# Patient Record
Sex: FEMALE | Race: BLACK OR AFRICAN AMERICAN | NOT HISPANIC OR LATINO | Employment: PART TIME | URBAN - METROPOLITAN AREA
[De-identification: names, ages, dates, MRNs, and addresses within clinical notes are randomized per-mention and may not be internally consistent; named-entity substitution may affect disease eponyms.]

---

## 2023-10-18 DIAGNOSIS — M54.50 LOW BACK PAIN, UNSPECIFIED BACK PAIN LATERALITY, UNSPECIFIED CHRONICITY, UNSPECIFIED WHETHER SCIATICA PRESENT: Primary | ICD-10-CM

## 2023-10-20 RX ORDER — CYCLOBENZAPRINE HCL 10 MG
10 TABLET ORAL NIGHTLY PRN
Qty: 30 TABLET | Refills: 1 | Status: SHIPPED | OUTPATIENT
Start: 2023-10-20 | End: 2023-12-27

## 2023-12-23 DIAGNOSIS — M54.50 LOW BACK PAIN, UNSPECIFIED BACK PAIN LATERALITY, UNSPECIFIED CHRONICITY, UNSPECIFIED WHETHER SCIATICA PRESENT: ICD-10-CM

## 2023-12-27 RX ORDER — CYCLOBENZAPRINE HCL 10 MG
10 TABLET ORAL NIGHTLY PRN
Qty: 30 TABLET | Refills: 1 | Status: SHIPPED | OUTPATIENT
Start: 2023-12-27 | End: 2024-03-06

## 2024-01-28 DIAGNOSIS — M16.0 BILATERAL PRIMARY OSTEOARTHRITIS OF HIP: ICD-10-CM

## 2024-01-29 RX ORDER — DICLOFENAC SODIUM 25 MG/1
25 TABLET, DELAYED RELEASE ORAL DAILY
Qty: 30 TABLET | Refills: 1 | Status: SHIPPED | OUTPATIENT
Start: 2024-01-29 | End: 2024-04-08

## 2024-03-06 DIAGNOSIS — M54.50 LOW BACK PAIN, UNSPECIFIED BACK PAIN LATERALITY, UNSPECIFIED CHRONICITY, UNSPECIFIED WHETHER SCIATICA PRESENT: ICD-10-CM

## 2024-03-06 RX ORDER — CYCLOBENZAPRINE HCL 10 MG
10 TABLET ORAL NIGHTLY PRN
Qty: 30 TABLET | Refills: 1 | Status: SHIPPED | OUTPATIENT
Start: 2024-03-06 | End: 2024-05-08

## 2024-03-26 PROBLEM — L73.9 FOLLICULITIS: Status: ACTIVE | Noted: 2024-03-26

## 2024-03-26 PROBLEM — M54.50 LOW BACK PAIN: Status: ACTIVE | Noted: 2024-03-26

## 2024-03-26 PROBLEM — Z86.2 HISTORY OF SARCOIDOSIS: Status: ACTIVE | Noted: 2024-03-26

## 2024-03-26 PROBLEM — M25.551 RIGHT HIP PAIN: Status: ACTIVE | Noted: 2024-03-26

## 2024-03-26 PROBLEM — R74.8 ELEVATED LIVER ENZYMES: Status: ACTIVE | Noted: 2024-03-26

## 2024-04-07 DIAGNOSIS — M16.0 BILATERAL PRIMARY OSTEOARTHRITIS OF HIP: ICD-10-CM

## 2024-04-08 RX ORDER — DICLOFENAC SODIUM 25 MG/1
25 TABLET, DELAYED RELEASE ORAL DAILY
Qty: 30 TABLET | Refills: 1 | Status: SHIPPED | OUTPATIENT
Start: 2024-04-08 | End: 2024-06-06

## 2024-04-17 ENCOUNTER — OFFICE VISIT (OUTPATIENT)
Dept: DERMATOLOGY | Facility: CLINIC | Age: 64
End: 2024-04-17
Payer: MEDICAID

## 2024-04-17 ENCOUNTER — APPOINTMENT (OUTPATIENT)
Dept: DERMATOLOGY | Facility: CLINIC | Age: 64
End: 2024-04-17
Payer: MEDICAID

## 2024-04-17 DIAGNOSIS — L73.9 FOLLICULITIS: Primary | ICD-10-CM

## 2024-04-17 RX ORDER — DOXYCYCLINE 100 MG/1
TABLET ORAL
Qty: 30 TABLET | Refills: 0 | Status: SHIPPED | OUTPATIENT
Start: 2024-04-17 | End: 2024-05-09 | Stop reason: SDUPTHER

## 2024-04-17 RX ORDER — CLINDAMYCIN PHOSPHATE 10 UG/ML
LOTION TOPICAL
Qty: 60 ML | Refills: 3 | Status: SHIPPED | OUTPATIENT
Start: 2024-04-17 | End: 2024-05-01 | Stop reason: SDUPTHER

## 2024-04-17 NOTE — PROGRESS NOTES
Subjective     Shelton Grimaldo is a 64 y.o. female who presents for the following: pus bumps on chest and under breasts and in groin    Notes being diagnosed with folliculitis in the past and prescribed clindamycin 1% lotion which was helpful. She ran out of the medication and notes recurrent painful pustules and draining red bumps on chest, underneath breasts, and in the groin.    Review of Systems:  No other skin or systemic complaints other than what is documented elsewhere in the note.    The following portions of the chart were reviewed this encounter and updated as appropriate:   Allergies         Skin Cancer History  No skin cancer on file.      Specialty Problems          Dermatology Problems    Folliculitis        Objective   Well appearing patient in no apparent distress; mood and affect are within normal limits.    A focused skin examination was performed. All findings within normal limits unless otherwise noted below.    Assessment/Plan   1. Folliculitis  Follicularly-based erythematous papules and pustules on chest, inframammary chest, groin.    Folliculitis is an inflammatory condition of the hair follicles that often results in itchy and/or painful raised bumps.  It is often due to normal skin bacteria as well as normal fungus (yeast) on the skin.  The treatment options include both topical and systemic (oral) therapy.    clindamycin (Cleocin T) 1 % lotion  Apply to chest, under breasts, and in groin once daily in the morning.    Related Medications  doxycycline (Adoxa) 100 mg tablet  Take 1 pill once daily.  Take with a meals and swallow with full glass of water.  Do not lie down for at least 30 minutes after      Follow up in 3 months    Adonis Swenson DO, MPH  PGY-4, Dept. of Dermatology      Addendum: patient called to request a new script with two bottles since large surface area is being treated. New script sent 5/1/2024.

## 2024-05-01 RX ORDER — CLINDAMYCIN PHOSPHATE 10 UG/ML
LOTION TOPICAL
Qty: 120 ML | Refills: 3 | Status: SHIPPED | OUTPATIENT
Start: 2024-05-01

## 2024-05-07 ENCOUNTER — APPOINTMENT (OUTPATIENT)
Dept: PRIMARY CARE | Facility: CLINIC | Age: 64
End: 2024-05-07
Payer: MEDICAID

## 2024-05-08 DIAGNOSIS — M54.50 LOW BACK PAIN, UNSPECIFIED BACK PAIN LATERALITY, UNSPECIFIED CHRONICITY, UNSPECIFIED WHETHER SCIATICA PRESENT: ICD-10-CM

## 2024-05-08 DIAGNOSIS — L73.9 FOLLICULITIS: ICD-10-CM

## 2024-05-08 RX ORDER — CYCLOBENZAPRINE HCL 10 MG
10 TABLET ORAL NIGHTLY PRN
Qty: 30 TABLET | Refills: 1 | Status: SHIPPED | OUTPATIENT
Start: 2024-05-08 | End: 2024-06-06 | Stop reason: SDUPTHER

## 2024-05-09 RX ORDER — DOXYCYCLINE 100 MG/1
TABLET ORAL
Qty: 30 TABLET | Refills: 0 | Status: SHIPPED | OUTPATIENT
Start: 2024-05-09

## 2024-06-04 DIAGNOSIS — M16.0 BILATERAL PRIMARY OSTEOARTHRITIS OF HIP: ICD-10-CM

## 2024-06-06 DIAGNOSIS — M16.0 BILATERAL PRIMARY OSTEOARTHRITIS OF HIP: ICD-10-CM

## 2024-06-06 DIAGNOSIS — M54.50 LOW BACK PAIN, UNSPECIFIED BACK PAIN LATERALITY, UNSPECIFIED CHRONICITY, UNSPECIFIED WHETHER SCIATICA PRESENT: ICD-10-CM

## 2024-06-06 RX ORDER — DICLOFENAC SODIUM 25 MG/1
25 TABLET, DELAYED RELEASE ORAL DAILY
Qty: 30 TABLET | Refills: 1 | Status: SHIPPED | OUTPATIENT
Start: 2024-06-06 | End: 2024-06-06 | Stop reason: WASHOUT

## 2024-06-06 RX ORDER — CYCLOBENZAPRINE HCL 10 MG
10 TABLET ORAL NIGHTLY PRN
Qty: 30 TABLET | Refills: 1 | Status: SHIPPED | OUTPATIENT
Start: 2024-06-06

## 2024-06-06 RX ORDER — DICLOFENAC SODIUM 25 MG/1
25 TABLET, DELAYED RELEASE ORAL DAILY
Qty: 30 TABLET | Refills: 1 | Status: SHIPPED | OUTPATIENT
Start: 2024-06-06

## 2024-07-17 ENCOUNTER — APPOINTMENT (OUTPATIENT)
Dept: DERMATOLOGY | Facility: CLINIC | Age: 64
End: 2024-07-17
Payer: MEDICAID

## 2024-07-17 DIAGNOSIS — L30.9 DERMATITIS: Primary | ICD-10-CM

## 2024-07-17 DIAGNOSIS — L73.9 FOLLICULITIS: ICD-10-CM

## 2024-07-17 RX ORDER — CLINDAMYCIN PHOSPHATE 10 UG/ML
LOTION TOPICAL
Qty: 120 ML | Refills: 11 | Status: SHIPPED | OUTPATIENT
Start: 2024-07-17

## 2024-07-17 RX ORDER — BENZOYL PEROXIDE 50 MG/ML
1 LIQUID TOPICAL DAILY
Qty: 240 G | Refills: 11 | Status: SHIPPED | OUTPATIENT
Start: 2024-07-17 | End: 2025-07-17

## 2024-07-17 RX ORDER — TRIAMCINOLONE ACETONIDE 1 MG/G
CREAM TOPICAL 2 TIMES DAILY
Qty: 80 G | Refills: 3 | Status: SHIPPED | OUTPATIENT
Start: 2024-07-17 | End: 2024-07-31

## 2024-07-17 NOTE — PROGRESS NOTES
Subjective     Shelton Grimaldo is a 64 y.o. female who presents for follow-up for folliculitis.     Patient seen in April 2024 and prescribed doxycyline course and clindamycin lotion for folliculitis of circumstantial neck. Notes clindamycin lotion has helped. She also has eczematous plaques on shoulder and arm which has been present for several months. No other complaints.     Review of Systems:  No other skin or systemic complaints other than what is documented elsewhere in the note.    The following portions of the chart were reviewed this encounter and updated as appropriate:         Skin Cancer History  No skin cancer on file.      Specialty Problems          Dermatology Problems    Folliculitis        Objective   Well appearing patient in no apparent distress; mood and affect are within normal limits.    A full examination was performed including scalp, head, eyes, ears, nose, lips, neck, chest, axillae, abdomen, back, buttocks, bilateral upper extremities, bilateral lower extremities, hands, feet, fingers, toes, fingernails, and toenails. All findings within normal limits unless otherwise noted below.    Assessment/Plan   1. Dermatitis  Right Shoulder - Anterior  Scaly erythematous papules and plaques with edema and vesiculation in a distribution consistent with contact.    Eczema  - Discussed etiology with patient as it relates to inflammation of the skin  - Counseled on using moisturizer daily to body  - START triamcinolone 0.1% cream to affected areas BID for 2 weeks. Take 1 week off and repeat.     2. Folliculitis  Neck - Anterior  Follicularly-based erythematous papules and pustules.    Folliculitis  - Discussed etiology with patient as it relates to inflammation of pilosebaceous unit  - CONTINUE clindamycin 1% lotion to area BID  - START BPO 5% wash to chest and neck daily when bathing      Related Medications  clindamycin (Cleocin T) 1 % lotion  Apply to chest, under breasts, and in groin once daily in  the morning.    doxycycline (Adoxa) 100 mg tablet  TAKE 1 PILL ONCE DAILY. TAKE WITH A MEALS AND SWALLOW WITH FULL GLASS OF WATER. DO NOT LIE DOWN FOR AT LEAST 30 MINUTES AFTER        Quintin Francois MD  PGY4 Dermatology

## 2024-08-20 DIAGNOSIS — M16.0 BILATERAL PRIMARY OSTEOARTHRITIS OF HIP: ICD-10-CM

## 2024-08-22 RX ORDER — DICLOFENAC SODIUM 25 MG/1
25 TABLET, DELAYED RELEASE ORAL DAILY
Qty: 30 TABLET | Refills: 1 | Status: SHIPPED | OUTPATIENT
Start: 2024-08-22

## 2024-09-25 ENCOUNTER — TELEPHONE (OUTPATIENT)
Dept: DERMATOLOGY | Facility: CLINIC | Age: 64
End: 2024-09-25

## 2024-09-25 NOTE — TELEPHONE ENCOUNTER
Pt called requesting a refill for:  oxycycline (Adoxa) 100 mg tablet     Asking for it to go to [facility]/pharmacy #[id number] - [place], [place] - [address] AT [facility]

## 2024-10-09 DIAGNOSIS — M54.50 LOW BACK PAIN, UNSPECIFIED BACK PAIN LATERALITY, UNSPECIFIED CHRONICITY, UNSPECIFIED WHETHER SCIATICA PRESENT: ICD-10-CM

## 2024-10-09 RX ORDER — CYCLOBENZAPRINE HCL 10 MG
TABLET ORAL
Qty: 30 TABLET | Refills: 1 | Status: SHIPPED | OUTPATIENT
Start: 2024-10-09

## 2024-10-16 ENCOUNTER — TELEPHONE (OUTPATIENT)
Dept: DERMATOLOGY | Facility: CLINIC | Age: 64
End: 2024-10-16

## 2024-10-16 DIAGNOSIS — L73.9 FOLLICULITIS: ICD-10-CM

## 2024-10-16 RX ORDER — BENZOYL PEROXIDE 50 MG/ML
1 LIQUID TOPICAL DAILY
Qty: 240 G | Refills: 11 | Status: SHIPPED | OUTPATIENT
Start: 2024-10-16 | End: 2025-10-16

## 2024-10-16 NOTE — TELEPHONE ENCOUNTER
Pt is requesting a refill for benzoyl peroxide 5 % external wash   Asking for it to go to [id number]

## 2024-11-27 ENCOUNTER — PATIENT OUTREACH (OUTPATIENT)
Dept: PRIMARY CARE | Facility: CLINIC | Age: 64
End: 2024-11-27
Payer: MEDICAID

## 2024-11-27 DIAGNOSIS — Z12.31 ENCOUNTER FOR SCREENING MAMMOGRAM FOR BREAST CANCER: ICD-10-CM

## 2025-02-27 ENCOUNTER — TELEPHONE (OUTPATIENT)
Dept: DERMATOLOGY | Facility: CLINIC | Age: 65
End: 2025-02-27
Payer: MEDICARE

## 2025-02-27 DIAGNOSIS — L73.9 FOLLICULITIS: ICD-10-CM

## 2025-02-27 RX ORDER — DOXYCYCLINE 100 MG/1
TABLET ORAL
Qty: 30 TABLET | Refills: 0 | Status: SHIPPED | OUTPATIENT
Start: 2025-02-27

## 2025-02-27 NOTE — TELEPHONE ENCOUNTER
Discussed with patient that she should have plenty of refills for Benzoyl peroxide cleanser and clindamycin lotion. Patient is aware.    She only had a 30 day course of doxycycline 100mg daily with significant improvement and missed follow up visits. She is now flaring again in the groin despite using BPO and clindamycin.    I offered patient a sooner visit with our [facility] office, however, patient stated she is unable to travel to this site. Patient takes a bus as primary transportation so it is inconvenient.     Discussed that I will send Doxycycline 100mg daily for 30 day supply. I encouraged patient to schedule a primary care visit. She stated that she was transitioning to Medicare and was unable to see her previous PCP during transition. She stated she will contact her PCP to schedule a visit.    Patient is scheduled to follow up on 7/16/25 for further assessment.

## 2025-04-02 ENCOUNTER — APPOINTMENT (OUTPATIENT)
Dept: DERMATOLOGY | Facility: CLINIC | Age: 65
End: 2025-04-02
Payer: MEDICARE

## 2025-07-16 ENCOUNTER — OFFICE VISIT (OUTPATIENT)
Dept: DERMATOLOGY | Facility: CLINIC | Age: 65
End: 2025-07-16
Payer: MEDICARE

## 2025-07-16 DIAGNOSIS — L73.2 HIDRADENITIS SUPPURATIVA: Primary | ICD-10-CM

## 2025-07-16 DIAGNOSIS — L73.9 FOLLICULITIS: ICD-10-CM

## 2025-07-16 PROCEDURE — 99214 OFFICE O/P EST MOD 30 MIN: CPT | Performed by: STUDENT IN AN ORGANIZED HEALTH CARE EDUCATION/TRAINING PROGRAM

## 2025-07-16 PROCEDURE — 99214 OFFICE O/P EST MOD 30 MIN: CPT | Mod: GC | Performed by: STUDENT IN AN ORGANIZED HEALTH CARE EDUCATION/TRAINING PROGRAM

## 2025-07-16 PROCEDURE — G2211 COMPLEX E/M VISIT ADD ON: HCPCS | Performed by: STUDENT IN AN ORGANIZED HEALTH CARE EDUCATION/TRAINING PROGRAM

## 2025-07-16 PROCEDURE — 1159F MED LIST DOCD IN RCRD: CPT | Performed by: STUDENT IN AN ORGANIZED HEALTH CARE EDUCATION/TRAINING PROGRAM

## 2025-07-16 NOTE — Clinical Note
Involving the inframammary breasts and the mons pubis there are single and grouped follicular based pustules and nodules with minimal scarring

## 2025-07-16 NOTE — Clinical Note
Hidradenitis Suppurativa - [place] Stage II  - Discussed the chronic and inflammatory nature of this condition in significant detail with the patient today.   - Given that patient cannot get benzoyl peroxide covered by insurance, we instead recommend an antibacterial Dial soap that contains Triclosan. Use once daily while bathing.  - Continue use of clindamycin 1% lotion once daily applied to affected areas   - Start Doxycycline 100mg once daily. Discussed the risks, benefits, potential adverse effects in detail. Advised patient to take with full meal and stay sitting upright for 30 minutes after taking.   - No lesions amenable to injection with intralesional kenalog today. We advised the patient to contact our office if she experiences any flares and we can see her in clinic for ILK injections.

## 2025-07-18 ENCOUNTER — TELEPHONE (OUTPATIENT)
Dept: DERMATOLOGY | Facility: CLINIC | Age: 65
End: 2025-07-18
Payer: MEDICARE

## 2025-07-18 RX ORDER — DOXYCYCLINE 100 MG/1
TABLET ORAL
Qty: 30 TABLET | Refills: 5 | Status: SHIPPED | OUTPATIENT
Start: 2025-07-18

## 2025-07-18 RX ORDER — CLINDAMYCIN PHOSPHATE 10 UG/ML
LOTION TOPICAL
Qty: 120 ML | Refills: 11 | Status: SHIPPED | OUTPATIENT
Start: 2025-07-18

## 2025-07-18 NOTE — TELEPHONE ENCOUNTER
Pt called stating she saw Dr. Cleveland on Wednesday 7/16/2025 and she never received RX's.  I looked in pt chart and saw that the resident Dr. Ken Khan sent her prescriptions in today.     I returned pt phone call and let her know that her prescription were sent in.

## 2025-07-18 NOTE — PROGRESS NOTES
Subjective     Shelton Grimaldo is a 65 y.o. female who presents for the following: Eruption under breasts and groin.    Ms. Grimaldo is a 65 year old female who presents for follow up. She has been treated for folliculitis with benzoyl peroxide and clindamycin lotion over the past 12 months. She states that she is currently using benzoyl peroxide every other day when she has it. She states that her insurance will no longer cover the medication and she can only buy it when she has enough money. She is using the clindamycin daily.     She states she continues to get flares about once every other month.          Review of Systems:  No other skin or systemic complaints other than what is documented elsewhere in the note.    The following portions of the chart were reviewed this encounter and updated as appropriate:          Skin Cancer History  Biopsy Log Book  No skin cancers from Specimen Tracking.    Additional History      Specialty Problems          Dermatology Problems    Folliculitis        Objective   Well appearing patient in no apparent distress; mood and affect are within normal limits.    A focused skin examination was performed. All findings within normal limits unless otherwise noted below.    Assessment/Plan   Skin Exam  1. HIDRADENITIS SUPPURATIVA  Generalized  Involving the inframammary breasts and the mons pubis there are single and grouped follicular based pustules and nodules with minimal scarring  Hidradenitis Suppurativa - [place] Stage II  - Discussed the chronic and inflammatory nature of this condition in significant detail with the patient today.   - Given that patient cannot get benzoyl peroxide covered by insurance, we instead recommend an antibacterial Dial soap that contains Triclosan. Use once daily while bathing.  - Continue use of clindamycin 1% lotion once daily applied to affected areas   - Start Doxycycline 100mg once daily. Discussed the risks, benefits, potential adverse effects in  detail. Advised patient to take with full meal and stay sitting upright for 30 minutes after taking.   - No lesions amenable to injection with intralesional kenalog today. We advised the patient to contact our office if she experiences any flares and we can see her in clinic for ILK injections.  2. FOLLICULITIS      Existing Treatments  - clindamycin (Cleocin T) 1 % lotion - Apply to chest, under breasts, and in groin once daily in the morning.  - benzoyl peroxide 5 % external wash - Apply 1 Application topically once daily. Use as body wash on chest and neck daily. Leave in place for 1-2 minutes before rinsing.  - doxycycline (Adoxa) 100 mg tablet - Take 1 pill once daily.  Take with a meals and swallow with full glass of water.  Do not lie down for at least 30 minutes after    Ken Khan MD  PGY-4, Department of Dermatology

## 2025-08-06 DIAGNOSIS — L73.9 FOLLICULITIS: ICD-10-CM
